# Patient Record
Sex: MALE | Race: WHITE | NOT HISPANIC OR LATINO | Employment: FULL TIME | URBAN - METROPOLITAN AREA
[De-identification: names, ages, dates, MRNs, and addresses within clinical notes are randomized per-mention and may not be internally consistent; named-entity substitution may affect disease eponyms.]

---

## 2022-05-27 ENCOUNTER — OFFICE VISIT (OUTPATIENT)
Dept: INTERNAL MEDICINE | Facility: CLINIC | Age: 37
End: 2022-05-27

## 2022-05-27 VITALS
BODY MASS INDEX: 27.81 KG/M2 | DIASTOLIC BLOOD PRESSURE: 60 MMHG | TEMPERATURE: 96.8 F | SYSTOLIC BLOOD PRESSURE: 118 MMHG | OXYGEN SATURATION: 98 % | WEIGHT: 187.8 LBS | HEART RATE: 94 BPM | HEIGHT: 69 IN

## 2022-05-27 DIAGNOSIS — Z13.228 SCREENING FOR METABOLIC DISORDER: ICD-10-CM

## 2022-05-27 DIAGNOSIS — A60.00 GENITAL HERPES SIMPLEX, UNSPECIFIED SITE: ICD-10-CM

## 2022-05-27 DIAGNOSIS — Z00.00 ANNUAL PHYSICAL EXAM: Primary | ICD-10-CM

## 2022-05-27 DIAGNOSIS — Z13.0 SCREENING, ANEMIA, DEFICIENCY, IRON: ICD-10-CM

## 2022-05-27 DIAGNOSIS — J45.20 MILD INTERMITTENT CHILDHOOD ASTHMA WITHOUT COMPLICATION: ICD-10-CM

## 2022-05-27 DIAGNOSIS — Z13.29 SCREENING FOR THYROID DISORDER: ICD-10-CM

## 2022-05-27 DIAGNOSIS — Z13.220 SCREENING, LIPID: ICD-10-CM

## 2022-05-27 PROBLEM — J30.1 HAY FEVER: Status: ACTIVE | Noted: 2018-05-01

## 2022-05-27 PROBLEM — J45.909 CHILDHOOD ASTHMA: Status: ACTIVE | Noted: 2022-05-27

## 2022-05-27 PROBLEM — R55 VASOVAGAL RESPONSE: Status: ACTIVE | Noted: 2022-05-27

## 2022-05-27 PROBLEM — J30.1 HAY FEVER: Status: RESOLVED | Noted: 2018-05-01 | Resolved: 2022-05-27

## 2022-05-27 PROCEDURE — 99385 PREV VISIT NEW AGE 18-39: CPT | Performed by: FAMILY MEDICINE

## 2022-05-27 RX ORDER — ACYCLOVIR 400 MG/1
400 TABLET ORAL
COMMUNITY

## 2022-05-27 RX ORDER — LORATADINE 10 MG/1
TABLET ORAL
COMMUNITY

## 2022-05-27 RX ORDER — CYCLOBENZAPRINE HCL 10 MG
10 TABLET ORAL
COMMUNITY
End: 2022-05-27

## 2022-05-27 RX ORDER — DIPHENOXYLATE HYDROCHLORIDE AND ATROPINE SULFATE 2.5; .025 MG/1; MG/1
TABLET ORAL
COMMUNITY
End: 2022-05-27

## 2022-05-27 RX ORDER — IBUPROFEN 200 MG
200 TABLET ORAL EVERY 6 HOURS PRN
COMMUNITY

## 2022-05-27 NOTE — PROGRESS NOTES
Date of Encounter: 2022  Patient: Simba José,  1985    Subjective   History of Presenting Illness  Chief complaint: Annual physical    No acute concerns.    Recently moved from [place], [place] to [place] partially for a job but also just is a general relocation.  They are settling in well.      Childhood asthma last exacerbation almost 20 years ago, no current symptoms or recent inhaler use.  He does not have allergic rhinitis since moving to [place].    HSV genitalia, he uses acyclovir as needed for outbreaks.    Lives with wife, 3 children ages 1, 3, and 5.  Never smoker.  2 alcoholic drinks per week.  Does not exercise partially due to time restrictions.  Food choices are healthy but he does eat a little bit more than he once, would like to lose about 10 pounds.  He works for [employer] as a  part-time desk job part-time warehouse job.  He has had COVID-19 #1 and #2, declines #3.  Believes Tdap was about 5 years ago with his first born.  They did have COVID in January and it was fortunately mild.    Review of Systems:  Negative for fever, congestion, chest pain upon exertion, shortness of breath, vision changes, vomiting, dysuria, lymphadenopathy, muscle weakness, numbness, mood changes, rashes.    The following portions of the patient's history were reviewed and updated as appropriate: allergies, current medications, past family history, past medical history, past social history, past surgical history and problem list.    Patient Active Problem List   Diagnosis   • Genital herpes   • Childhood asthma     Past Medical History:   Diagnosis Date   • Allergic      Past Surgical History:   Procedure Laterality Date   • WISDOM TOOTH EXTRACTION       Family History   Problem Relation Age of Onset   • Cancer Mother    • Hypertension Father    • Hyperlipidemia Father    • Diabetes Father    • Skin cancer Maternal Grandfather    • Hypertension Paternal Grandfather    •  "Hyperlipidemia Paternal Grandfather    • Diabetes Paternal Grandfather        Current Outpatient Medications:   •  acyclovir (ZOVIRAX) 400 MG tablet, Take 400 mg by mouth Every 4 (Four) Hours While Awake. Take no more than 5 doses a day., Disp: , Rfl:   •  ibuprofen (ADVIL,MOTRIN) 200 MG tablet, Take 200 mg by mouth Every 6 (Six) Hours As Needed., Disp: , Rfl:   •  loratadine (CLARITIN) 10 MG tablet, Take  by mouth., Disp: , Rfl:   No Known Allergies  Social History     Tobacco Use   • Smoking status: Never Smoker   • Smokeless tobacco: Never Used   Substance Use Topics   • Alcohol use: Yes          Objective   Physical Exam  Vitals:    05/27/22 0830   BP: 118/60   BP Location: Left arm   Patient Position: Sitting   Cuff Size: Adult   Pulse: 94   Temp: 96.8 °F (36 °C)   TempSrc: Temporal   SpO2: 98%   Weight: 85.2 kg (187 lb 12.8 oz)   Height: 175.3 cm (69\")     Body mass index is 27.73 kg/m².    Constitutional: NAD.  Eyes: EOMI. PERRLA. Normal conjunctiva.  Ear, nose, mouth, throat: No tonsillar exudates or erythema.   Normal nasal mucosa. Normal external ear canals and TMs bilaterally.  Cardiovascular: RRR. No murmurs. No LE edema b/l. Radial pulses 2+ bilaterally.  Pulmonary: CTA b/l. Good effort.  Integumentary: No rashes or wounds on face or upper extremities.  Lymphatic: No anterior cervical lymphadenopathy.  Endocrine: No thyromegaly or palpable thyroid nodules.  Psychiatric: Normal affect. Normal thought content.  Gastrointestinal: Nondistended. No hepatosplenomegaly. No focal tenderness to palpation. Normal bowel sounds.       Assessment & Plan   Assessment and Plan  Pleasant 36-year-old male with childhood asthma, HSV, who presents with the following:    Diagnoses and all orders for this visit:    1. Annual physical exam (Primary): We discussed preventative care including age and patient-appropriate immunizations and cancer screening. We also discussed the importance of exercise and a healthy diet. This is " their annual preventative exam.    2. Mild intermittent childhood asthma without complication: Appears to have resolved with no recent symptoms    3. Genital herpes simplex, unspecified site: Controlled with acyclovir as needed    4. Screening for thyroid disorder  -     Thyroid Panel With TSH    5. Screening for metabolic disorder  -     Comprehensive Metabolic Panel    6. Screening, anemia, deficiency, iron  -     CBC & Differential    7. Screening, lipid  -     Lipid Panel    Return to office in 1 year for annual physical or earlier as needed.    Leif Oconnor MD  Family Medicine  O: [phone]  C: [phone]    Disclaimer: Parts of this note were dictated by speech recognition. Minor errors in transcription may be present. Please call if questions.

## 2022-05-28 LAB
ALBUMIN SERPL-MCNC: 4.4 G/DL (ref 4–5)
ALBUMIN/GLOB SERPL: 1.6 {RATIO} (ref 1.2–2.2)
ALP SERPL-CCNC: 62 IU/L (ref 44–121)
ALT SERPL-CCNC: 22 IU/L (ref 0–44)
AST SERPL-CCNC: 20 IU/L (ref 0–40)
BASOPHILS # BLD AUTO: 0 X10E3/UL (ref 0–0.2)
BASOPHILS NFR BLD AUTO: 1 %
BILIRUB SERPL-MCNC: 0.2 MG/DL (ref 0–1.2)
BUN SERPL-MCNC: 21 MG/DL (ref 6–20)
BUN/CREAT SERPL: 24 (ref 9–20)
CALCIUM SERPL-MCNC: 9.4 MG/DL (ref 8.7–10.2)
CHLORIDE SERPL-SCNC: 104 MMOL/L (ref 96–106)
CHOLEST SERPL-MCNC: 215 MG/DL (ref 100–199)
CO2 SERPL-SCNC: 23 MMOL/L (ref 20–29)
CREAT SERPL-MCNC: 0.87 MG/DL (ref 0.76–1.27)
EGFRCR SERPLBLD CKD-EPI 2021: 115 ML/MIN/1.73
EOSINOPHIL # BLD AUTO: 0.1 X10E3/UL (ref 0–0.4)
EOSINOPHIL NFR BLD AUTO: 1 %
ERYTHROCYTE [DISTWIDTH] IN BLOOD BY AUTOMATED COUNT: 12.3 % (ref 11.6–15.4)
FT4I SERPL CALC-MCNC: 2.3 (ref 1.2–4.9)
GLOBULIN SER CALC-MCNC: 2.7 G/DL (ref 1.5–4.5)
GLUCOSE SERPL-MCNC: 91 MG/DL (ref 65–99)
HCT VFR BLD AUTO: 42.5 % (ref 37.5–51)
HDLC SERPL-MCNC: 29 MG/DL
HGB BLD-MCNC: 14.1 G/DL (ref 13–17.7)
IMM GRANULOCYTES # BLD AUTO: 0.1 X10E3/UL (ref 0–0.1)
IMM GRANULOCYTES NFR BLD AUTO: 1 %
LDLC SERPL CALC-MCNC: 119 MG/DL (ref 0–99)
LYMPHOCYTES # BLD AUTO: 2.4 X10E3/UL (ref 0.7–3.1)
LYMPHOCYTES NFR BLD AUTO: 34 %
MCH RBC QN AUTO: 29.3 PG (ref 26.6–33)
MCHC RBC AUTO-ENTMCNC: 33.2 G/DL (ref 31.5–35.7)
MCV RBC AUTO: 88 FL (ref 79–97)
MONOCYTES # BLD AUTO: 0.9 X10E3/UL (ref 0.1–0.9)
MONOCYTES NFR BLD AUTO: 13 %
MORPHOLOGY BLD-IMP: NORMAL
NEUTROPHILS # BLD AUTO: 3.5 X10E3/UL (ref 1.4–7)
NEUTROPHILS NFR BLD AUTO: 50 %
PLATELET # BLD AUTO: 237 X10E3/UL (ref 150–450)
POTASSIUM SERPL-SCNC: 4.4 MMOL/L (ref 3.5–5.2)
PROT SERPL-MCNC: 7.1 G/DL (ref 6–8.5)
RBC # BLD AUTO: 4.81 X10E6/UL (ref 4.14–5.8)
SODIUM SERPL-SCNC: 143 MMOL/L (ref 134–144)
T3RU NFR SERPL: 29 % (ref 24–39)
T4 SERPL-MCNC: 8 UG/DL (ref 4.5–12)
TRIGL SERPL-MCNC: 378 MG/DL (ref 0–149)
TSH SERPL DL<=0.005 MIU/L-ACNC: 1.07 UIU/ML (ref 0.45–4.5)
VLDLC SERPL CALC-MCNC: 67 MG/DL (ref 5–40)
WBC # BLD AUTO: 6.9 X10E3/UL (ref 3.4–10.8)

## 2022-05-31 PROBLEM — E78.5 HYPERLIPIDEMIA: Status: ACTIVE | Noted: 2022-05-31

## 2022-10-28 ENCOUNTER — OFFICE VISIT (OUTPATIENT)
Dept: INTERNAL MEDICINE | Facility: CLINIC | Age: 37
End: 2022-10-28

## 2022-10-28 VITALS
HEIGHT: 69 IN | SYSTOLIC BLOOD PRESSURE: 110 MMHG | WEIGHT: 187 LBS | OXYGEN SATURATION: 98 % | DIASTOLIC BLOOD PRESSURE: 60 MMHG | TEMPERATURE: 97.6 F | HEART RATE: 76 BPM | BODY MASS INDEX: 27.7 KG/M2

## 2022-10-28 DIAGNOSIS — M76.31 IT BAND SYNDROME, RIGHT: Primary | ICD-10-CM

## 2022-10-28 PROCEDURE — 99213 OFFICE O/P EST LOW 20 MIN: CPT | Performed by: FAMILY MEDICINE

## 2022-10-28 NOTE — PROGRESS NOTES
"Date of Encounter: 10/28/2022  Patient: Simba José,  1985    Subjective   History of Presenting Illness  Chief complaint: Right thigh pain    6 weeks of intermittent aching on the right side of his thigh, often occurs in the area of the hip, outside of the right knee, and occasionally down to the calf.  No known inciting injury though he does occasionally get muscle cramps in this area.  He describes this discomfort as a \"lactic acid sensation\".  He feels it has been worsening a little bit.  Getting up from sitting to standing tends to make it worse.  Sitting with his leg under him tends to make it worse.  Ibuprofen does help a little bit.  He does not have any catching or locking of his knee.  He does not do any dedicated exercise or play any sports currently    Review of Systems:  Negative for fever, cough, shortness of breath    The following portions of the patient's history were reviewed and updated as appropriate: allergies, current medications, past family history, past medical history, past social history, past surgical history and problem list.    Patient Active Problem List   Diagnosis   • Genital herpes   • Childhood asthma   • Vasovagal response to blood draws   • Hyperlipidemia     Past Medical History:   Diagnosis Date   • Allergic      Past Surgical History:   Procedure Laterality Date   • WISDOM TOOTH EXTRACTION       Family History   Problem Relation Age of Onset   • Cancer Mother    • Hypertension Father    • Hyperlipidemia Father    • Diabetes Father    • Skin cancer Maternal Grandfather    • Hypertension Paternal Grandfather    • Hyperlipidemia Paternal Grandfather    • Diabetes Paternal Grandfather        Current Outpatient Medications:   •  acyclovir (ZOVIRAX) 400 MG tablet, Take 400 mg by mouth Every 4 (Four) Hours While Awake. Take no more than 5 doses a day., Disp: , Rfl:   •  ibuprofen (ADVIL,MOTRIN) 200 MG tablet, Take 200 mg by mouth Every 6 (Six) Hours As Needed., Disp: , " "Rfl:   •  loratadine (CLARITIN) 10 MG tablet, Take  by mouth., Disp: , Rfl:   No Known Allergies  Social History     Tobacco Use   • Smoking status: Never   • Smokeless tobacco: Never   Substance Use Topics   • Alcohol use: Yes          Objective   Physical Exam  Vitals:    10/28/22 1123   BP: 110/60   Pulse: 76   Temp: 97.6 °F (36.4 °C)   SpO2: 98%   Weight: 84.8 kg (187 lb)   Height: 175.3 cm (69\")     Body mass index is 27.62 kg/m².    Constitutional: NAD.    Psychiatric: Normal affect. Normal thought content.  Musculoskeletal: Right knee with normal range of motion and strength.  Intact medial and lateral collateral ligaments.  Normal anterior and posterior drawer test.  Aleksandra test is negative.  No palpable effusion.  There is tenderness to palpation down the right iliotibial tract especially lateral to the right knee.  There is discomfort with hip abduction against resistance.  Hip exam is normal and passive range of motion with no discomfort on anterior flexion or internal and external rotation. there is no tenderness to palpation of the right greater trochanteric bursa.     Assessment & Plan   Assessment and Plan  Pleasant 37-year-old male with childhood asthma who presents with the following    Diagnoses and all orders for this visit:    1. It band syndrome, right (Primary)    Examination consistent with IT band syndrome.  I discussed conservative management with continued NSAIDs, ice and heat, stretches and exercises, foam rolling, activity modification. TENS unit may be helpful as well.  I would recommend physical therapy if he is not able to improve this with home therapy    Leif Oconnor MD  Family Medicine  O: [phone]    Disclaimer: Parts of this note were dictated by speech recognition. Minor errors in transcription may be present. Please call if questions.     "

## 2023-06-02 ENCOUNTER — OFFICE VISIT (OUTPATIENT)
Dept: INTERNAL MEDICINE | Facility: CLINIC | Age: 38
End: 2023-06-02

## 2023-06-02 VITALS
HEART RATE: 69 BPM | BODY MASS INDEX: 28.23 KG/M2 | OXYGEN SATURATION: 98 % | HEIGHT: 69 IN | WEIGHT: 190.6 LBS | TEMPERATURE: 96.7 F | SYSTOLIC BLOOD PRESSURE: 112 MMHG | DIASTOLIC BLOOD PRESSURE: 78 MMHG

## 2023-06-02 DIAGNOSIS — A60.00 GENITAL HERPES SIMPLEX, UNSPECIFIED SITE: ICD-10-CM

## 2023-06-02 DIAGNOSIS — J45.20 MILD INTERMITTENT CHILDHOOD ASTHMA WITHOUT COMPLICATION: ICD-10-CM

## 2023-06-02 DIAGNOSIS — Z11.59 ENCOUNTER FOR HEPATITIS C SCREENING TEST FOR LOW RISK PATIENT: ICD-10-CM

## 2023-06-02 DIAGNOSIS — Z00.00 ANNUAL PHYSICAL EXAM: Primary | ICD-10-CM

## 2023-06-02 DIAGNOSIS — Z13.89 SCREENING FOR BLOOD OR PROTEIN IN URINE: ICD-10-CM

## 2023-06-02 DIAGNOSIS — E78.5 HYPERLIPIDEMIA, UNSPECIFIED HYPERLIPIDEMIA TYPE: ICD-10-CM

## 2023-06-02 DIAGNOSIS — R55 VASOVAGAL RESPONSE: ICD-10-CM

## 2023-06-02 PROCEDURE — 99395 PREV VISIT EST AGE 18-39: CPT | Performed by: FAMILY MEDICINE

## 2023-06-02 RX ORDER — LORATADINE 10 MG/1
10 TABLET ORAL DAILY
COMMUNITY

## 2023-06-02 NOTE — PROGRESS NOTES
Date of Encounter: 2023  Patient: Simba José,  1985    Subjective   History of Presenting Illness  Chief complaint: Annual physical     No acute concerns.    Hyperlipidemia with high LDL, total cholesterol, and triglycerides, low HDL. He was not fasting at the time of this appointment.  He is not fasting today.    SSV genitalia using acyclovir daily to prevent outbreaks.  No recent episodes.  Tolerates medication well.    Childhood asthma with last asthma exacerbation over 20 years ago with no recent symptoms.  He does have some mild allergic rhinitis for which he uses loratadine as needed     History of vasovagal with blood draws.    Lives with wife, 3 children ages 3, 5, and 7.  They are in the process of moving. Never smoker.  2 alcoholic drinks per week. Not currently exercising. Food choices are overall healthy, limited sugars, they do eat 4-5 red meat meals per week.  He works for [employer] as a  for [employer] line of business across multiple sites, not a lot of travel fortunately. Discussed COVID-19 booster.    Review of Systems:  Negative for fever, congestion, chest pain upon exertion, shortness of breath, vision changes, vomiting, dysuria, lymphadenopathy, muscle weakness, numbness, mood changes, rashes.    The following portions of the patient's history were reviewed and updated as appropriate: allergies, current medications, past family history, past medical history, past social history, past surgical history and problem list.    Patient Active Problem List   Diagnosis   • Genital herpes   • Childhood asthma   • Vasovagal response to blood draws   • Hyperlipidemia     Past Medical History:   Diagnosis Date   • Allergic      Past Surgical History:   Procedure Laterality Date   • WISDOM TOOTH EXTRACTION       Family History   Problem Relation Age of Onset   • Cancer Mother    • Hypertension Father    • Hyperlipidemia Father    • Diabetes Father    • Skin cancer Maternal  "Grandfather    • Hypertension Paternal Grandfather    • Hyperlipidemia Paternal Grandfather    • Diabetes Paternal Grandfather        Current Outpatient Medications:   •  acyclovir (ZOVIRAX) 400 MG tablet, Take 1 tablet by mouth Every 4 (Four) Hours While Awake. Take no more than 5 doses a day., Disp: , Rfl:   •  ibuprofen (ADVIL,MOTRIN) 200 MG tablet, Take 1 tablet by mouth Every 6 (Six) Hours As Needed., Disp: , Rfl:   •  loratadine (CLARITIN) 10 MG tablet, Take 1 tablet by mouth Daily., Disp: , Rfl:   No Known Allergies  Social History     Tobacco Use   • Smoking status: Never   • Smokeless tobacco: Never   Substance Use Topics   • Alcohol use: Yes          Objective   Physical Exam  Vitals:    06/02/23 0902   BP: 112/78   BP Location: Left arm   Patient Position: Sitting   Cuff Size: Adult   Pulse: 69   Temp: 96.7 °F (35.9 °C)   TempSrc: Infrared   SpO2: 98%   Weight: 86.5 kg (190 lb 9.6 oz)   Height: 175.3 cm (69\")     Body mass index is 28.15 kg/m².    Constitutional: NAD.  Eyes: EOMI. PERRLA. Normal conjunctiva.  Ear, nose, mouth, throat: Postnasal drip. No tonsillar exudates or erythema.  Mildly boggy nasal mucosa.  No sinus tenderness to palpation. Normal external ear canals and TMs bilaterally.  Cardiovascular: RRR. No murmurs. No LE edema b/l. Radial pulses 2+ bilaterally.  Pulmonary: CTA b/l. Good effort.  Integumentary: No rashes or wounds on face or upper extremities.  Lymphatic: No anterior cervical lymphadenopathy.  Endocrine: No thyromegaly or palpable thyroid nodules.  Psychiatric: Normal affect. Normal thought content.  Gastrointestinal: Nondistended. No hepatosplenomegaly. No focal tenderness to palpation. Normal bowel sounds.       Assessment & Plan   Assessment and Plan  Pleasant 37-year-old male with hyperlipidemia, history of childhood asthma, HSV, who presents for the following:    Diagnoses and all orders for this visit:    1. Annual physical exam (Primary): We discussed preventative care " including age and patient-appropriate immunizations and cancer screening. We also discussed the importance of exercise and a healthy diet. This is their annual preventative exam.    2. Hyperlipidemia, unspecified hyperlipidemia type: Recommend repeat lipids when fasting, will arrange lab only appointment.  Discussed reduction in red meat intake.  -     CBC & Differential; Future  -     Comprehensive Metabolic Panel; Future  -     Lipid Panel; Future  -     Thyroid Panel With TSH; Future    3. Genital herpes simplex, unspecified site: Controlled with acyclovir    4. Mild intermittent childhood asthma without complication: No symptoms since childhood    5. Vasovagal response to blood draws    6. Screening for blood or protein in urine  -     Urinalysis With Microscopic - Urine, Clean Catch; Future    7. Encounter for hepatitis C screening test for low risk patient  -     HCV Antibody Rfx To Qnt PCR; Future    Return to office in 1 year for annual physical or earlier as needed.    Leif Oconnor MD  Family Medicine  O: [phone]    Disclaimer: Parts of this note were dictated by speech recognition. Minor errors in transcription may be present. Please call if questions.

## 2023-09-28 ENCOUNTER — FLU SHOT (OUTPATIENT)
Dept: INTERNAL MEDICINE | Facility: CLINIC | Age: 38
End: 2023-09-28
Payer: COMMERCIAL

## 2023-09-28 DIAGNOSIS — Z23 NEED FOR INFLUENZA VACCINATION: Primary | ICD-10-CM

## 2023-09-28 PROCEDURE — 90686 IIV4 VACC NO PRSV 0.5 ML IM: CPT | Performed by: FAMILY MEDICINE

## 2023-09-28 PROCEDURE — 90471 IMMUNIZATION ADMIN: CPT | Performed by: FAMILY MEDICINE

## 2024-03-21 ENCOUNTER — TELEPHONE (OUTPATIENT)
Dept: INTERNAL MEDICINE | Facility: CLINIC | Age: 39
End: 2024-03-21
Payer: COMMERCIAL

## 2024-03-21 NOTE — TELEPHONE ENCOUNTER
Pt wife called for pt and herself asking for a strep test. Pt stated in the background of the phone call that he has a sore throat but nothing else. Patients 3 children have strep and all are on their second and third rounds of antibiotics, they believe they are all passing it around.    Patient wife is in office today getting tested and patient is scheduled tomorrow for testing but patient is wanting to know if dr. Oconnor would go ahead and prescribe an antibiotic if his wife IS strep positive at todays visit so he doesn't have to come in tomorrow    Please advise

## 2024-03-22 ENCOUNTER — OFFICE VISIT (OUTPATIENT)
Dept: INTERNAL MEDICINE | Facility: CLINIC | Age: 39
End: 2024-03-22
Payer: COMMERCIAL

## 2024-03-22 VITALS
HEART RATE: 99 BPM | BODY MASS INDEX: 26.48 KG/M2 | SYSTOLIC BLOOD PRESSURE: 102 MMHG | TEMPERATURE: 98.6 F | RESPIRATION RATE: 16 BRPM | DIASTOLIC BLOOD PRESSURE: 62 MMHG | OXYGEN SATURATION: 97 % | WEIGHT: 185 LBS | HEIGHT: 70 IN

## 2024-03-22 DIAGNOSIS — J02.9 SORE THROAT: Primary | ICD-10-CM

## 2024-03-22 DIAGNOSIS — R50.9 FEVER, UNSPECIFIED FEVER CAUSE: ICD-10-CM

## 2024-03-22 DIAGNOSIS — J02.0 STREP PHARYNGITIS: ICD-10-CM

## 2024-03-22 DIAGNOSIS — R52 BODY ACHES: ICD-10-CM

## 2024-03-22 LAB
EXPIRATION DATE: ABNORMAL
EXPIRATION DATE: NORMAL
FLUAV AG UPPER RESP QL IA.RAPID: NOT DETECTED
FLUBV AG UPPER RESP QL IA.RAPID: NOT DETECTED
INTERNAL CONTROL: ABNORMAL
INTERNAL CONTROL: NORMAL
Lab: ABNORMAL
Lab: NORMAL
S PYO AG THROAT QL: POSITIVE
SARS-COV-2 AG UPPER RESP QL IA.RAPID: NOT DETECTED

## 2024-03-22 PROCEDURE — 87428 SARSCOV & INF VIR A&B AG IA: CPT | Performed by: NURSE PRACTITIONER

## 2024-03-22 PROCEDURE — 87880 STREP A ASSAY W/OPTIC: CPT | Performed by: NURSE PRACTITIONER

## 2024-03-22 PROCEDURE — 99213 OFFICE O/P EST LOW 20 MIN: CPT | Performed by: NURSE PRACTITIONER

## 2024-03-22 RX ORDER — AMOXICILLIN 500 MG/1
500 CAPSULE ORAL 2 TIMES DAILY
Qty: 20 CAPSULE | Refills: 0 | Status: SHIPPED | OUTPATIENT
Start: 2024-03-22 | End: 2024-04-01

## 2024-03-22 NOTE — PROGRESS NOTES
"Chief Complaint  Sore Throat (X 1 day), Fever (X 1 day), and Generalized Body Aches (X 1 day)    Subjective        Simba José presents to [facility]  History of Present Illness  Here with 1 day history of fever, general body aches, and sore throat.     His children have recently been diagnosed and treated for strep pharyngitis. Denies chest pain, shortness of air, dysphagia. He does not appear acutely ill.      Objective   Vital Signs:  /62 (BP Location: Left arm, Patient Position: Sitting, Cuff Size: Adult)   Pulse 99   Temp 98.6 °F (37 °C) (Infrared)   Resp 16   Ht 177.8 cm (70\")   Wt 83.9 kg (185 lb)   SpO2 97%   BMI 26.54 kg/m²   Estimated body mass index is 26.54 kg/m² as calculated from the following:    Height as of this encounter: 177.8 cm (70\").    Weight as of this encounter: 83.9 kg (185 lb).       BMI is >= 25 and <30. (Overweight) The following options were offered after discussion;: not discussed at today's office visit.      Physical Exam  Vitals reviewed.   Constitutional:       General: He is not in acute distress.     Appearance: Normal appearance. He is normal weight. He is not ill-appearing, toxic-appearing or diaphoretic.   HENT:      Mouth/Throat:      Pharynx: Uvula midline. Posterior oropharyngeal erythema present.      Tonsils: No tonsillar exudate (eythematous). 3+ on the right. 3+ on the left.   Skin:     General: Skin is warm.   Neurological:      General: No focal deficit present.      Mental Status: He is alert and oriented to person, place, and time. Mental status is at baseline.   Psychiatric:         Mood and Affect: Mood normal.         Behavior: Behavior normal.         Thought Content: Thought content normal.         Judgment: Judgment normal.        Result Review :                   Assessment and Plan   Patient is a 38 year-old male with hyperlipidemia, hx genital herpes, hx childhood asthmas, vasovagal response to blood draws " testing positive for strep A.    Results for orders placed or performed in visit on 03/22/24   POCT SARS-CoV-2 Antigen LIGIA + Flu    Specimen: Swab   Result Value Ref Range    SARS Antigen Not Detected Not Detected, Presumptive Negative    Influenza A Antigen LIGIA Not Detected Not Detected    Influenza B Antigen LIGIA Not Detected Not Detected    Internal Control Passed Passed    Lot Number [id number]     Expiration Date 1,172,025    POC Rapid Strep A    Specimen: Swab   Result Value Ref Range    Rapid Strep A Screen Positive (A) Negative, VALID, INVALID, Not Performed    Internal Control Passed Passed    Lot Number [id number]     Expiration Date 6,012,026              Diagnoses and all orders for this visit:    1. Sore throat (Primary)  -     POCT SARS-CoV-2 Antigen LIGIA + Flu  -     POC Rapid Strep A    2. Fever, unspecified fever cause  -     POCT SARS-CoV-2 Antigen LIGIA + Flu  -     POC Rapid Strep A    3. Body aches  -     POCT SARS-CoV-2 Antigen LIGIA + Flu  -     POC Rapid Strep A    4. Strep pharyngitis  -     amoxicillin (AMOXIL) 500 MG capsule; Take 1 capsule by mouth 2 (Two) Times a Day for 10 days.  Dispense: 20 capsule; Refill: 0    Be sure to change out your toothbrush 2 to 3 days after starting antibiotic.  Do not share any eating utensils or cups with other members of her household.  Treat symptoms with over-the-counter medication, such as ibuprofen or acetaminophen.       Follow Up   Return for Follow up with Dr. Oconnor as needed..  Patient was given instructions and counseling regarding his condition or for health maintenance advice. Please see specific information pulled into the AVS if appropriate.

## 2024-06-06 ENCOUNTER — OFFICE VISIT (OUTPATIENT)
Dept: INTERNAL MEDICINE | Facility: CLINIC | Age: 39
End: 2024-06-06
Payer: COMMERCIAL

## 2024-06-06 VITALS
HEART RATE: 80 BPM | DIASTOLIC BLOOD PRESSURE: 60 MMHG | HEIGHT: 70 IN | SYSTOLIC BLOOD PRESSURE: 110 MMHG | WEIGHT: 193.8 LBS | TEMPERATURE: 97.1 F | OXYGEN SATURATION: 98 % | BODY MASS INDEX: 27.75 KG/M2

## 2024-06-06 DIAGNOSIS — A60.00 GENITAL HERPES SIMPLEX, UNSPECIFIED SITE: ICD-10-CM

## 2024-06-06 DIAGNOSIS — J45.20 MILD INTERMITTENT CHILDHOOD ASTHMA WITHOUT COMPLICATION: ICD-10-CM

## 2024-06-06 DIAGNOSIS — E78.5 HYPERLIPIDEMIA, UNSPECIFIED HYPERLIPIDEMIA TYPE: ICD-10-CM

## 2024-06-06 DIAGNOSIS — Z00.00 ANNUAL PHYSICAL EXAM: Primary | ICD-10-CM

## 2024-06-06 PROCEDURE — 99395 PREV VISIT EST AGE 18-39: CPT | Performed by: FAMILY MEDICINE

## 2024-06-06 NOTE — PROGRESS NOTES
Date of Encounter: 2024  Patient: Simba José,  1985    Subjective   History of Presenting Illness  Chief complaint: Annual physical     No acute concerns.    He and his family will be moving back to [place] this summer.  Will be closer to family and also have about 2 acres they are renting to enjoy.  Need to find new PCP.  He will still be working for [employer].    Hyperlipidemia.  No interval lifestyle changes.  He is not fasting today.     HSV genitalia using acyclovir once daily as needed to prevent outbreaks.  No recent episodes.      Childhood asthma with last asthma exacerbation over 20 years ago with no recent symptoms.  Allergic rhinitis remains stable.  Has a little bit when in [place] as well.     History of vasovagal with blood draws.     Lives with wife, 3 children ages 4, 6, and 8. Never smoker. 2 alcoholic drinks per week. Not currently exercising. Food choices are overall healthy, limited sugars, reduced his red meat intake over the past year however.  No family history of internal malignancies. He works for [employer] as a  for [employer] line of business across multiple sites. Discussed COVID-19 vaccination.    The following portions of the patient's history were reviewed and updated as appropriate: allergies, current medications, past family history, past medical history, past social history, past surgical history and problem list.    Patient Active Problem List   Diagnosis    Genital herpes    Childhood asthma    Vasovagal response to blood draws    Hyperlipidemia     Past Medical History:   Diagnosis Date    Allergic      Past Surgical History:   Procedure Laterality Date    WISDOM TOOTH EXTRACTION       Family History   Problem Relation Age of Onset    Cancer Mother         skin    Hypertension Father     Hyperlipidemia Father     Diabetes Father     Skin cancer Maternal Grandfather     Hypertension Paternal Grandfather     Hyperlipidemia Paternal Grandfather      "Diabetes Paternal Grandfather        Current Outpatient Medications:     ibuprofen (ADVIL,MOTRIN) 200 MG tablet, Take 1 tablet by mouth Every 6 (Six) Hours As Needed., Disp: , Rfl:     loratadine (CLARITIN) 10 MG tablet, Take 1 tablet by mouth Daily., Disp: , Rfl:     acyclovir (ZOVIRAX) 400 MG tablet, Take 1 tablet by mouth Every 4 (Four) Hours While Awake. Take no more than 5 doses a day. (Patient not taking: Reported on 6/6/2024), Disp: , Rfl:   No Known Allergies  Social History     Tobacco Use    Smoking status: Never    Smokeless tobacco: Never   Substance Use Topics    Alcohol use: Yes          Objective   Physical Exam  Vitals:    06/06/24 0921   BP: 110/60   BP Location: Left arm   Patient Position: Sitting   Cuff Size: Large Adult   Pulse: 80   Temp: 97.1 °F (36.2 °C)   TempSrc: Infrared   SpO2: 98%   Weight: 87.9 kg (193 lb 12.8 oz)   Height: 177.8 cm (70\")     Body mass index is 27.81 kg/m².    Constitutional: NAD.  Eyes: EOMI. PERRLA. Normal conjunctiva.  Ear, nose, mouth, throat: Mild postnasal drip.  No tonsillar exudates or erythema.  Thick yellow rhinorrhea with boggy nasal mucosa. Normal external ear canals and TMs bilaterally.  Cardiovascular: RRR. No murmurs. No LE edema b/l. Radial pulses 2+ bilaterally.  Pulmonary: CTA b/l. Good effort.  Integumentary: No rashes or wounds on face or upper extremities.  Lymphatic: No anterior cervical lymphadenopathy.  Endocrine: No thyromegaly or palpable thyroid nodules.  Psychiatric: Normal affect. Normal thought content.  Gastrointestinal: Nondistended. No hepatosplenomegaly. No focal tenderness to palpation. Normal bowel sounds.     Assessment & Plan   Assessment and Plan  38-year-old male with hyperlipidemia, HSV, childhood asthma, who presents for the following:    Diagnoses and all orders for this visit:    1. Annual physical exam (Primary): We discussed preventative care including age and patient-appropriate immunizations and cancer screening. We also " discussed the importance of exercise and a healthy diet. This is their annual preventative exam.    2. Hyperlipidemia, unspecified hyperlipidemia type: Okay to skip annual blood work this year but when getting lipids in the future need to be fasting.  Discussed lifestyle management of elevated lipids and importance of long-term monitoring    3. Mild intermittent childhood asthma without complication: No symptoms or sequela    4. Genital herpes simplex, unspecified site: Controlled    Return to office in 1 year for annual physical or earlier as needed.     Leif Oconnor MD  Family Medicine  O: [phone]    Disclaimer: Parts of this note were dictated by speech recognition. Minor errors in transcription may be present. Please call if questions.